# Patient Record
(demographics unavailable — no encounter records)

---

## 2025-04-09 NOTE — DISCUSSION/SUMMARY
[FreeTextEntry1] : Developmental delay with poor sleep patterns. Rule out sleep disorder. Referral given for sleep evaluation by Dr OLIVIER Knowles. Will get EEG. Continue supportive services. RTO prn. Rx written for chloral hydrate 1500 mg with 1 refill. Note sent to Dr Wong(PCP) advising to do a hearing test. Total clinician time spent on 4/9/2025 is 48 minutes including preparing to see the patient, obtaining and/or reviewing and confirming history, performing a medically necessary and appropriate examination, counseling and educating the patient and/or family, documenting clinical information in the EHR and communicating and/or referring to other healthcare professionals.

## 2025-04-09 NOTE — CONSULT LETTER
[Dear  ___] : Dear  [unfilled], [Please see my note below.] : Please see my note below. [Sincerely,] : Sincerely, [FreeTextEntry1] : Thank you for sending  ROBERT DE LA PAZ  to me for neurological evaluation. This is an initial encounter with a new pt. [FreeTextEntry3] : Dr Gandara

## 2025-04-09 NOTE — HISTORY OF PRESENT ILLNESS
[FreeTextEntry1] : 3 year old male with delayed language skills and poor sleep patterns. Pt will typically awaken most nights after 3 hrs sleep, screaming with eyes closed tight, hands fisted and tense, hitting his head, lasting from 30 -60 minutes before resuming sleep. No clonic movements seen. Walked at 14 months old. Jargons and babbles but says no words. No recent hearing test done. Received ST privately from March 2024 until September 2024, then started pre-K with ST, OT, PT and and SI therapy. Has good eye contact and name response. PMH -ve. On no meds. NKA. Birth: FTNSVD no complications. FMH -ve for ASD or epilepsy.

## 2025-04-09 NOTE — PHYSICAL EXAM
[FreeTextEntry1] : Alert, NAD. Non-verbal., Intermittent eye contact. Heart sounds NL. Neck FROM. PERRL, EOMI, face symmetric, hearing grossly intact. Tone, power, gait, DTRs NL. No nystagmus or tremor.

## 2025-07-24 NOTE — HISTORY OF PRESENT ILLNESS
[de-identified] : Gardenia is a 3 year old male who presents to the walk in accompanied by his parents for evaluation of L elbow pain s/p injury that occurred on July 19, 2025.  Mom states he was playing outside when he tripped over a stone and landed on his arm.  Mom states she did not see the injury occur but the patient's uncle did who states how he fell.  He told mom "agudelo agudelo" and pointed to his elbow.  She noticed swelling and bruising prompting them to go to the ER for X-rays.  X-rays were taken and did not show a fracture however I reviewed them with the parents and advised them there is most likely an occult fracture as there is a fat pad on the X-ray.  The patient has been babying the arm but is not taking anything for pain.

## 2025-07-24 NOTE — ASSESSMENT
[FreeTextEntry1] : 3 year old male with L elbow fracture.  I have placed him in a well molded long arm fiberglass cast and he will f/u in 3 weeks for reassessment. I reviewed cast care with patient's parents and they will give him OTC medication prn.  Mom is aware if there is any issue she can contact the office.

## 2025-07-24 NOTE — IMAGING
[de-identified] : L elbow:  + diffuse tenderness, edema noted over the lateral aspect, decreased ROM with extension and flexion, NV intact, mild ecchymosis noted.